# Patient Record
Sex: FEMALE | Race: WHITE | NOT HISPANIC OR LATINO | URBAN - METROPOLITAN AREA
[De-identification: names, ages, dates, MRNs, and addresses within clinical notes are randomized per-mention and may not be internally consistent; named-entity substitution may affect disease eponyms.]

---

## 2017-08-07 PROCEDURE — 87340 HEPATITIS B SURFACE AG IA: CPT | Performed by: FAMILY MEDICINE

## 2017-08-07 PROCEDURE — 87389 HIV-1 AG W/HIV-1&-2 AB AG IA: CPT | Performed by: FAMILY MEDICINE

## 2017-08-07 PROCEDURE — 86803 HEPATITIS C AB TEST: CPT | Performed by: FAMILY MEDICINE

## 2017-08-07 PROCEDURE — 88175 CYTOPATH C/V AUTO FLUID REDO: CPT | Performed by: FAMILY MEDICINE

## 2017-08-07 PROCEDURE — 36415 COLL VENOUS BLD VENIPUNCTURE: CPT | Performed by: FAMILY MEDICINE

## 2018-12-28 ENCOUNTER — WALK IN (OUTPATIENT)
Dept: URGENT CARE | Age: 28
End: 2018-12-28

## 2018-12-28 VITALS
WEIGHT: 202.38 LBS | RESPIRATION RATE: 16 BRPM | TEMPERATURE: 98 F | HEART RATE: 80 BPM | SYSTOLIC BLOOD PRESSURE: 126 MMHG | DIASTOLIC BLOOD PRESSURE: 76 MMHG

## 2018-12-28 DIAGNOSIS — J00 ACUTE NASOPHARYNGITIS: ICD-10-CM

## 2018-12-28 DIAGNOSIS — J01.90 ACUTE NON-RECURRENT SINUSITIS, UNSPECIFIED LOCATION: Primary | ICD-10-CM

## 2018-12-28 PROCEDURE — 99204 OFFICE O/P NEW MOD 45 MIN: CPT | Performed by: NURSE PRACTITIONER

## 2018-12-28 RX ORDER — NORETHINDRONE ACETATE AND ETHINYL ESTRADIOL 1; 5 MG/1; UG/1
TABLET ORAL
COMMUNITY

## 2018-12-28 RX ORDER — BENZONATATE 100 MG/1
CAPSULE ORAL
Qty: 14 CAPSULE | Refills: 0 | Status: SHIPPED | OUTPATIENT
Start: 2018-12-28

## 2018-12-28 RX ORDER — IBUPROFEN 200 MG
600 TABLET ORAL EVERY 8 HOURS PRN
Qty: 30 TABLET | Refills: 0 | COMMUNITY
Start: 2018-12-28

## 2018-12-28 RX ORDER — ACETAMINOPHEN 500 MG
1000 TABLET ORAL EVERY 8 HOURS PRN
Qty: 30 TABLET | Refills: 0 | COMMUNITY
Start: 2018-12-28

## 2018-12-28 RX ORDER — AMOXICILLIN AND CLAVULANATE POTASSIUM 875; 125 MG/1; MG/1
1 TABLET, FILM COATED ORAL 2 TIMES DAILY
Qty: 10 TABLET | Refills: 0 | Status: SHIPPED | OUTPATIENT
Start: 2018-12-28 | End: 2019-01-02

## 2018-12-28 RX ORDER — PREDNISONE 20 MG/1
60 TABLET ORAL DAILY
Qty: 9 TABLET | Refills: 0 | Status: SHIPPED | OUTPATIENT
Start: 2018-12-28 | End: 2018-12-31

## 2018-12-28 ASSESSMENT — ENCOUNTER SYMPTOMS
DIARRHEA: 0
NAUSEA: 0
VOMITING: 0
SHORTNESS OF BREATH: 0

## 2019-05-01 PROBLEM — F51.01 PRIMARY INSOMNIA: Status: ACTIVE | Noted: 2019-05-01

## 2019-05-01 PROBLEM — R61 DIAPHORESIS: Status: ACTIVE | Noted: 2019-05-01

## 2019-05-01 PROBLEM — R00.2 HEART PALPITATIONS: Status: ACTIVE | Noted: 2019-05-01

## 2019-09-03 PROBLEM — Z92.89 HOSPITALIZATION WITHIN LAST 30 DAYS: Status: ACTIVE | Noted: 2019-09-03

## 2019-09-03 PROBLEM — I26.99 OTHER ACUTE PULMONARY EMBOLISM WITHOUT ACUTE COR PULMONALE (HCC): Status: ACTIVE | Noted: 2019-09-03

## 2019-09-03 PROBLEM — I82.492 ACUTE DEEP VEIN THROMBOSIS (DVT) OF OTHER SPECIFIED VEIN OF LEFT LOWER EXTREMITY (HCC): Status: ACTIVE | Noted: 2019-09-03

## 2019-09-03 PROBLEM — Z30.42 ENCOUNTER FOR SURVEILLANCE OF INJECTABLE CONTRACEPTIVE: Status: ACTIVE | Noted: 2019-09-03

## 2019-09-03 PROBLEM — R39.9 SYMPTOMS INVOLVING URINARY SYSTEM: Status: ACTIVE | Noted: 2019-09-03

## 2019-09-03 PROCEDURE — 87086 URINE CULTURE/COLONY COUNT: CPT | Performed by: FAMILY MEDICINE

## 2019-09-03 PROCEDURE — 81003 URINALYSIS AUTO W/O SCOPE: CPT | Performed by: FAMILY MEDICINE

## 2019-09-19 ENCOUNTER — LAB ENCOUNTER (OUTPATIENT)
Dept: LAB | Facility: HOSPITAL | Age: 29
End: 2019-09-19
Attending: INTERNAL MEDICINE
Payer: COMMERCIAL

## 2019-09-19 DIAGNOSIS — D68.9 BLOOD CLOTTING DISORDER (HCC): Primary | ICD-10-CM

## 2019-09-19 PROCEDURE — 36415 COLL VENOUS BLD VENIPUNCTURE: CPT

## 2019-09-19 PROCEDURE — 81241 F5 GENE: CPT

## 2019-09-19 PROCEDURE — 81240 F2 GENE: CPT

## 2019-09-19 PROCEDURE — 85306 CLOT INHIBIT PROT S FREE: CPT

## 2019-09-19 PROCEDURE — 85303 CLOT INHIBIT PROT C ACTIVITY: CPT

## 2019-09-19 PROCEDURE — 85301 ANTITHROMBIN III ANTIGEN: CPT

## 2019-09-20 LAB — F2 C.20210G>A GENO BLD/T: NORMAL

## 2019-09-21 LAB
ANTITHROMBIN, ANTIGEN: 84 %
PROTEIN C FUNCTIONAL: 126 %
PROTEIN S FUNCTIONAL: 73 %

## 2019-09-23 ENCOUNTER — OFFICE VISIT (OUTPATIENT)
Dept: HEMATOLOGY/ONCOLOGY | Age: 29
End: 2019-09-23
Attending: INTERNAL MEDICINE
Payer: COMMERCIAL

## 2019-09-23 VITALS
TEMPERATURE: 98 F | HEART RATE: 81 BPM | BODY MASS INDEX: 27 KG/M2 | SYSTOLIC BLOOD PRESSURE: 127 MMHG | OXYGEN SATURATION: 99 % | RESPIRATION RATE: 18 BRPM | WEIGHT: 187.19 LBS | DIASTOLIC BLOOD PRESSURE: 81 MMHG

## 2019-09-23 DIAGNOSIS — I82.412 ACUTE DEEP VEIN THROMBOSIS (DVT) OF FEMORAL VEIN OF LEFT LOWER EXTREMITY (HCC): Primary | ICD-10-CM

## 2019-09-23 LAB
ALBUMIN SERPL-MCNC: 3.9 G/DL (ref 3.4–5)
ALBUMIN/GLOB SERPL: 1 {RATIO} (ref 1–2)
ALP LIVER SERPL-CCNC: 52 U/L (ref 37–98)
ALT SERPL-CCNC: 47 U/L (ref 13–56)
ANION GAP SERPL CALC-SCNC: 6 MMOL/L (ref 0–18)
AST SERPL-CCNC: 27 U/L (ref 15–37)
BASOPHILS # BLD AUTO: 0.05 X10(3) UL (ref 0–0.2)
BASOPHILS NFR BLD AUTO: 0.7 %
BILIRUB SERPL-MCNC: 0.5 MG/DL (ref 0.1–2)
BUN BLD-MCNC: 14 MG/DL (ref 7–18)
BUN/CREAT SERPL: 14.3 (ref 10–20)
CALCIUM BLD-MCNC: 8.8 MG/DL (ref 8.5–10.1)
CHLORIDE SERPL-SCNC: 104 MMOL/L (ref 98–112)
CO2 SERPL-SCNC: 28 MMOL/L (ref 21–32)
CREAT BLD-MCNC: 0.98 MG/DL (ref 0.55–1.02)
D-DIMER: <0.27 UG/ML FEU (ref ?–0.5)
DEPRECATED HBV CORE AB SER IA-ACNC: 57.5 NG/ML (ref 12–114)
DEPRECATED RDW RBC AUTO: 39.7 FL (ref 35.1–46.3)
EOSINOPHIL # BLD AUTO: 0.09 X10(3) UL (ref 0–0.7)
EOSINOPHIL NFR BLD AUTO: 1.3 %
ERYTHROCYTE [DISTWIDTH] IN BLOOD BY AUTOMATED COUNT: 12.3 % (ref 11–15)
GLOBULIN PLAS-MCNC: 3.8 G/DL (ref 2.8–4.4)
GLUCOSE BLD-MCNC: 85 MG/DL (ref 70–99)
HCT VFR BLD AUTO: 42.6 % (ref 35–48)
HGB BLD-MCNC: 14 G/DL (ref 12–16)
IMM GRANULOCYTES # BLD AUTO: 0.02 X10(3) UL (ref 0–1)
IMM GRANULOCYTES NFR BLD: 0.3 %
IRON SATURATION: 38 % (ref 15–50)
IRON SERPL-MCNC: 149 UG/DL (ref 50–170)
LYMPHOCYTES # BLD AUTO: 2.09 X10(3) UL (ref 1–4)
LYMPHOCYTES NFR BLD AUTO: 29.6 %
M PROTEIN MFR SERPL ELPH: 7.7 G/DL (ref 6.4–8.2)
MCH RBC QN AUTO: 29 PG (ref 26–34)
MCHC RBC AUTO-ENTMCNC: 32.9 G/DL (ref 31–37)
MCV RBC AUTO: 88.2 FL (ref 80–100)
MONOCYTES # BLD AUTO: 0.4 X10(3) UL (ref 0.1–1)
MONOCYTES NFR BLD AUTO: 5.7 %
NEUTROPHILS # BLD AUTO: 4.41 X10 (3) UL (ref 1.5–7.7)
NEUTROPHILS # BLD AUTO: 4.41 X10(3) UL (ref 1.5–7.7)
NEUTROPHILS NFR BLD AUTO: 62.4 %
OSMOLALITY SERPL CALC.SUM OF ELEC: 286 MOSM/KG (ref 275–295)
PLATELET # BLD AUTO: 169 10(3)UL (ref 150–450)
POTASSIUM SERPL-SCNC: 3.8 MMOL/L (ref 3.5–5.1)
RBC # BLD AUTO: 4.83 X10(6)UL (ref 3.8–5.3)
SODIUM SERPL-SCNC: 138 MMOL/L (ref 136–145)
TOTAL IRON BINDING CAPACITY: 390 UG/DL (ref 240–450)
TRANSFERRIN SERPL-MCNC: 262 MG/DL (ref 200–360)
WBC # BLD AUTO: 7.1 X10(3) UL (ref 4–11)

## 2019-09-23 PROCEDURE — 99244 OFF/OP CNSLTJ NEW/EST MOD 40: CPT | Performed by: INTERNAL MEDICINE

## 2019-09-23 NOTE — PROGRESS NOTES
[facility] Note    Diagnosis:   1. LLE DVT (SFV, Popliteal, peroneal and gastrocnemius). (08/13/19) 2/2 OCP, Factor V Leiden  2. Left lower lobe subsegmental artery PE without strain (08/13/19) 2/2 OCP, Factor V Leiden  3.  Hetero tablet Rfl: 3   Econazole Nitrate 1 % External Cream Apply 1 g topically 2 (two) times daily. Disp: 1 Tube Rfl: 3   medroxyPROGESTERone Acetate 150 MG/ML Intramuscular Suspension Inject 1 mL (150 mg total) into the muscle every 3 (three) months.  Disp: 1 mL 09/03/2019     09/03/2019    BUN 16.0 09/03/2019    GLUCOSE 84 05/20/2016    ALB 3.8 05/01/2019       Radiology: OSH imaging reports reviewed in care everywhere     Pathology: reviewed     Assessment and Plan:  LLE DVT and Left Subsegmental PE + Hete

## 2019-09-23 NOTE — PROGRESS NOTES
Education Record    Learner:  Patient and Family Member    Disease / Diagnosis:DVT with PE     Barriers / Limitations:  None   Comments:    Method:  Discussion   Comments:    General Topics:  Plan of care reviewed   Comments:    Outcome:  Shows understandi

## 2019-09-24 ENCOUNTER — LAB ENCOUNTER (OUTPATIENT)
Dept: LAB | Age: 29
End: 2019-09-24
Attending: INTERNAL MEDICINE
Payer: COMMERCIAL

## 2019-09-24 DIAGNOSIS — L60.9 NAIL DISORDER: Primary | ICD-10-CM

## 2019-09-24 PROCEDURE — 87101 SKIN FUNGI CULTURE: CPT

## 2019-09-24 PROCEDURE — 87107 FUNGI IDENTIFICATION MOLD: CPT

## 2019-09-25 ENCOUNTER — TELEPHONE (OUTPATIENT)
Dept: HEMATOLOGY/ONCOLOGY | Facility: HOSPITAL | Age: 29
End: 2019-09-25

## 2019-09-25 ENCOUNTER — HOSPITAL ENCOUNTER (OUTPATIENT)
Dept: INTERVENTIONAL RADIOLOGY/VASCULAR | Facility: HOSPITAL | Age: 29
Discharge: HOME | End: 2019-09-25
Attending: INTERNAL MEDICINE | Admitting: INTERNAL MEDICINE
Payer: COMMERCIAL

## 2019-09-25 VITALS
HEART RATE: 77 BPM | HEIGHT: 70 IN | BODY MASS INDEX: 26.77 KG/M2 | DIASTOLIC BLOOD PRESSURE: 69 MMHG | OXYGEN SATURATION: 96 % | RESPIRATION RATE: 16 BRPM | WEIGHT: 187 LBS | TEMPERATURE: 98 F | SYSTOLIC BLOOD PRESSURE: 111 MMHG

## 2019-09-25 DIAGNOSIS — I82.412 ACUTE DEEP VEIN THROMBOSIS (DVT) OF FEMORAL VEIN OF LEFT LOWER EXTREMITY (HCC): ICD-10-CM

## 2019-09-25 DIAGNOSIS — I82.412 ACUTE DEEP VEIN THROMBOSIS (DVT) OF FEMORAL VEIN OF LEFT LOWER EXTREMITY (HCC): Primary | ICD-10-CM

## 2019-09-25 LAB
INR BLD: 1.16 (ref 0.9–1.1)
PSA SERPL DL<=0.01 NG/ML-MCNC: 15.3 SECONDS (ref 12.5–14.7)

## 2019-09-25 PROCEDURE — 99153 MOD SED SAME PHYS/QHP EA: CPT

## 2019-09-25 PROCEDURE — 99152 MOD SED SAME PHYS/QHP 5/>YRS: CPT

## 2019-09-25 PROCEDURE — 36011 PLACE CATHETER IN VEIN: CPT

## 2019-09-25 PROCEDURE — B41G1ZZ FLUOROSCOPY OF LEFT LOWER EXTREMITY ARTERIES USING LOW OSMOLAR CONTRAST: ICD-10-PCS | Performed by: RADIOLOGY

## 2019-09-25 PROCEDURE — 36415 COLL VENOUS BLD VENIPUNCTURE: CPT

## 2019-09-25 PROCEDURE — B5191ZZ FLUOROSCOPY OF INFERIOR VENA CAVA USING LOW OSMOLAR CONTRAST: ICD-10-PCS | Performed by: RADIOLOGY

## 2019-09-25 PROCEDURE — 36012 PLACE CATHETER IN VEIN: CPT

## 2019-09-25 PROCEDURE — 85610 PROTHROMBIN TIME: CPT | Performed by: INTERNAL MEDICINE

## 2019-09-25 PROCEDURE — 75825 VEIN X-RAY TRUNK: CPT

## 2019-09-25 PROCEDURE — 75822 VEIN X-RAY ARMS/LEGS: CPT

## 2019-09-25 PROCEDURE — 76937 US GUIDE VASCULAR ACCESS: CPT

## 2019-09-25 RX ORDER — LIDOCAINE HYDROCHLORIDE 10 MG/ML
INJECTION, SOLUTION INFILTRATION; PERINEURAL
Status: COMPLETED
Start: 2019-09-25 | End: 2019-09-25

## 2019-09-25 RX ORDER — HEPARIN SODIUM 5000 [USP'U]/ML
INJECTION, SOLUTION INTRAVENOUS; SUBCUTANEOUS
Status: COMPLETED
Start: 2019-09-25 | End: 2019-09-25

## 2019-09-25 RX ORDER — SODIUM CHLORIDE 9 MG/ML
INJECTION, SOLUTION INTRAVENOUS CONTINUOUS
Status: DISCONTINUED | OUTPATIENT
Start: 2019-09-25 | End: 2019-09-25

## 2019-09-25 RX ORDER — MIDAZOLAM HYDROCHLORIDE 1 MG/ML
INJECTION INTRAMUSCULAR; INTRAVENOUS
Status: COMPLETED
Start: 2019-09-25 | End: 2019-09-25

## 2019-09-25 RX ADMIN — SODIUM CHLORIDE: 9 INJECTION, SOLUTION INTRAVENOUS at 07:30:00

## 2019-09-25 NOTE — TELEPHONE ENCOUNTER
Pt stts she was referred to IR- pt doesn't remember the physicians name but he wanted pt to call and have  order a ct scan of the abdomen. pls call pt when ordered.  Thank you

## 2019-09-25 NOTE — PROGRESS NOTES
Rc'd pt from IR in stable condition. VSS. Manual dressings to bilateral groins are soft, clean and dry. No bleeding or hematoma. Pt denies c/o pain or discomfort. DR Caitlin Still at bedside along with mother. Pt to remain on bedrest for 2hrs.      112:00: Bedrest co

## 2019-09-25 NOTE — PROCEDURES
[facility]  Procedure Note    [place] Patient Status:  Outpatient in a Bed    1990 MRN [id number]   Location [facility] [address] [facility] Attending Latisha White MD   Hosp Day # 0 PCP None Pcp     Procedure: LLE venogram,

## 2019-09-25 NOTE — H&P
[facility] Patient Status:  Outpatient in a Bed    1990 MRN [id number]   Location [address] [facility] Attending Prasad Ramos MD   Hosp Day # 0 PCP None Pcp     Admitting Diagnosis: Assessment/Plan:   Impression: 33 yo F DVT    I have discussed with the patient and/or legal representative the potential benefits, risks, and side effects of this procedure, the likelihood of the patient achieving goals; and the potential problems t

## 2019-09-27 LAB
ANA SCREEN: POSITIVE
CENTROMERE AUTOAB: <100 AU/ML (ref ?–100)
DSDNA AUTOAB: <100 IU/ML (ref ?–100)
HISTONE AUTOAB: <100 AU/ML (ref ?–100)
JO-1 AUTOAB: <100 AU/ML (ref ?–100)
RNP AUTOAB: <100 AU/ML (ref ?–100)
SCL-70 AUTOAB: 148 AU/ML (ref ?–100)
SM AUTOAB (SMITH): <100 AU/ML (ref ?–100)
SSA AUTOAB: <100 AU/ML (ref ?–100)
SSB AUTOAB: <100 AU/ML (ref ?–100)

## 2019-09-28 ENCOUNTER — HOSPITAL ENCOUNTER (OUTPATIENT)
Dept: CT IMAGING | Age: 29
Discharge: HOME | End: 2019-09-28
Attending: INTERNAL MEDICINE
Payer: COMMERCIAL

## 2019-09-28 DIAGNOSIS — I82.412 ACUTE DEEP VEIN THROMBOSIS (DVT) OF FEMORAL VEIN OF LEFT LOWER EXTREMITY (HCC): ICD-10-CM

## 2019-09-28 PROCEDURE — 74177 CT ABD & PELVIS W/CONTRAST: CPT | Performed by: INTERNAL MEDICINE

## 2019-11-18 ENCOUNTER — APPOINTMENT (OUTPATIENT)
Dept: HEMATOLOGY/ONCOLOGY | Age: 29
End: 2019-11-18
Attending: INTERNAL MEDICINE
Payer: COMMERCIAL

## 2019-11-25 ENCOUNTER — OFFICE VISIT (OUTPATIENT)
Dept: HEMATOLOGY/ONCOLOGY | Age: 29
End: 2019-11-25
Attending: INTERNAL MEDICINE
Payer: COMMERCIAL

## 2019-11-25 VITALS
RESPIRATION RATE: 18 BRPM | OXYGEN SATURATION: 99 % | WEIGHT: 195.19 LBS | SYSTOLIC BLOOD PRESSURE: 106 MMHG | DIASTOLIC BLOOD PRESSURE: 66 MMHG | BODY MASS INDEX: 28 KG/M2 | HEART RATE: 71 BPM | TEMPERATURE: 98 F

## 2019-11-25 DIAGNOSIS — I82.492 ACUTE DEEP VEIN THROMBOSIS (DVT) OF OTHER SPECIFIED VEIN OF LEFT LOWER EXTREMITY (HCC): Primary | ICD-10-CM

## 2019-11-25 PROCEDURE — 99213 OFFICE O/P EST LOW 20 MIN: CPT | Performed by: INTERNAL MEDICINE

## 2019-11-25 NOTE — PROGRESS NOTES
Education Record    Learner:  Patient    Disease / Diagnosis:DVT LLE     Barriers / Limitations:  None   Comments:    Method:  Discussion   Comments:    General Topics:  Plan of care reviewed   Comments:    Outcome:  Shows understanding   Comments:    Paulette

## 2019-11-25 NOTE — PROGRESS NOTES
[facility]    Diagnosis:   1. LLE DVT (SFV, Popliteal, peroneal and gastrocnemius). (08/13/19) 2/2 OCP, Factor V Leiden  2. Left lower lobe subsegmental artery PE without strain (08/13/19) 2/2 OCP, Factor V Leiden  3.  Hetero has substernal pain with exercise. She does have Raynaud's, more in the winter. 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 She is having extremely heavy periods every 2-3 weeks.  Her periods last 7 days and she sometimes ne murmurs  Extremities: No lower extremity swelling   Lungs: CTAB, no increased work of breathing  Abd: soft nt nd +BS no hepatosplenomegaly  Neuro: CN: II-XII grossly intact    Results:  Lab Results   Component Value Date    WBC 7.1 09/23/2019    HGB 14.0 0 progestin-only method. However, Depo-Provera is an acceptable contraceptive method if desired.      [facility]

## 2019-11-26 ENCOUNTER — APPOINTMENT (OUTPATIENT)
Dept: HEMATOLOGY/ONCOLOGY | Age: 29
End: 2019-11-26
Attending: INTERNAL MEDICINE
Payer: COMMERCIAL

## 2019-11-27 ENCOUNTER — TELEPHONE (OUTPATIENT)
Dept: HEMATOLOGY/ONCOLOGY | Facility: HOSPITAL | Age: 29
End: 2019-11-27

## 2019-11-27 NOTE — TELEPHONE ENCOUNTER
Spoke with patient. Prasad Ramos MD  P Edw B[facility] Michael Rns             Results reviewed. D-dimer is negative and she is negative for the Lupus Anticoagulant. Repeat D-dimer in 1 month.  Thanks

## 2019-12-17 ENCOUNTER — TELEPHONE (OUTPATIENT)
Dept: HEMATOLOGY/ONCOLOGY | Facility: HOSPITAL | Age: 29
End: 2019-12-17

## 2019-12-17 NOTE — TELEPHONE ENCOUNTER
Fernandez Del Toro called to ask that Dr. Nurys Chadnler place a referral in for her to see Dr. Yina Vaca. The office wont see her until until the referral is placed.

## 2019-12-23 ENCOUNTER — APPOINTMENT (OUTPATIENT)
Dept: HEMATOLOGY/ONCOLOGY | Age: 29
End: 2019-12-23
Attending: INTERNAL MEDICINE
Payer: COMMERCIAL

## 2019-12-23 ENCOUNTER — TELEPHONE (OUTPATIENT)
Dept: HEMATOLOGY/ONCOLOGY | Facility: HOSPITAL | Age: 29
End: 2019-12-23

## 2019-12-23 DIAGNOSIS — I82.492 ACUTE DEEP VEIN THROMBOSIS (DVT) OF OTHER SPECIFIED VEIN OF LEFT LOWER EXTREMITY (HCC): ICD-10-CM

## 2019-12-23 PROCEDURE — 36415 COLL VENOUS BLD VENIPUNCTURE: CPT

## 2019-12-23 PROCEDURE — 85379 FIBRIN DEGRADATION QUANT: CPT

## 2019-12-23 NOTE — TELEPHONE ENCOUNTER
Spoke with patient. She verbalized understanding. Refill sent. Sandy Rodríguez MD  P Edw Petra Rodriguez Rns             Results reviewed. Her D-dimer is elevated while off anticoagulation. Can you ask her to restart Eliquis for 3 more months.  After that we

## 2019-12-26 ENCOUNTER — HOSPITAL ENCOUNTER (OUTPATIENT)
Dept: ULTRASOUND IMAGING | Age: 29
Discharge: HOME | End: 2019-12-26
Attending: INTERNAL MEDICINE
Payer: COMMERCIAL

## 2019-12-26 ENCOUNTER — TELEPHONE (OUTPATIENT)
Dept: HEMATOLOGY/ONCOLOGY | Age: 29
End: 2019-12-26

## 2019-12-26 DIAGNOSIS — I82.492 ACUTE DEEP VEIN THROMBOSIS (DVT) OF OTHER SPECIFIED VEIN OF LEFT LOWER EXTREMITY (HCC): ICD-10-CM

## 2019-12-26 DIAGNOSIS — I82.412 ACUTE DEEP VEIN THROMBOSIS (DVT) OF FEMORAL VEIN OF LEFT LOWER EXTREMITY (HCC): ICD-10-CM

## 2019-12-26 NOTE — TELEPHONE ENCOUNTER
Spoke with patient. Bilateral US ordered per MD permission. Patient updated with time and location of exam. She verbalized understanding.

## 2019-12-27 ENCOUNTER — HOSPITAL ENCOUNTER (OUTPATIENT)
Dept: ULTRASOUND IMAGING | Age: 29
Discharge: HOME | End: 2019-12-27
Attending: INTERNAL MEDICINE
Payer: COMMERCIAL

## 2019-12-27 ENCOUNTER — TELEPHONE (OUTPATIENT)
Dept: HEMATOLOGY/ONCOLOGY | Facility: HOSPITAL | Age: 29
End: 2019-12-27

## 2019-12-27 DIAGNOSIS — I82.4Z2 ACUTE DEEP VEIN THROMBOSIS (DVT) OF DISTAL VEIN OF LEFT LOWER EXTREMITY (HCC): Primary | ICD-10-CM

## 2019-12-27 PROCEDURE — 93970 EXTREMITY STUDY: CPT | Performed by: INTERNAL MEDICINE

## 2019-12-27 NOTE — TELEPHONE ENCOUNTER
Discussed US Results with Dr. Idania Noonan, she appears to have an acute LLE DVT associated with an elevated D-dimer. She is symptomatic in left calf. She has been off of Eliquis for >1 month.  We will restart her Eliquis with 10 mg BID for 1 week followed by 5 mg B

## 2020-01-02 ENCOUNTER — TELEPHONE (OUTPATIENT)
Dept: HEMATOLOGY/ONCOLOGY | Age: 30
End: 2020-01-02

## 2020-01-02 ENCOUNTER — TELEPHONE (OUTPATIENT)
Dept: HEMATOLOGY/ONCOLOGY | Facility: HOSPITAL | Age: 30
End: 2020-01-02

## 2020-01-02 ENCOUNTER — TELEPHONE (OUTPATIENT)
Dept: FAMILY MEDICINE CLINIC | Facility: CLINIC | Age: 30
End: 2020-01-02

## 2020-01-02 DIAGNOSIS — R76.8 POSITIVE ANA (ANTINUCLEAR ANTIBODY): Primary | ICD-10-CM

## 2020-01-02 NOTE — TELEPHONE ENCOUNTER
Idalia Jhaveri from Dr. Coty Duque office called requesting that a referral be placed for the pt to be seen by teodoro Hermosillo, for elevated FERNY. Per Idalia Jhaveri, Dr. Coty Duque office tried to place the referral and were informed that the referral must come from the PCP.

## 2020-01-10 ENCOUNTER — LAB ENCOUNTER (OUTPATIENT)
Dept: LAB | Age: 30
End: 2020-01-10
Attending: INTERNAL MEDICINE
Payer: COMMERCIAL

## 2020-01-10 ENCOUNTER — OFFICE VISIT (OUTPATIENT)
Dept: RHEUMATOLOGY | Facility: CLINIC | Age: 30
End: 2020-01-10

## 2020-01-10 ENCOUNTER — APPOINTMENT (OUTPATIENT)
Dept: HEMATOLOGY/ONCOLOGY | Age: 30
End: 2020-01-10
Attending: INTERNAL MEDICINE
Payer: COMMERCIAL

## 2020-01-10 VITALS
TEMPERATURE: 98 F | SYSTOLIC BLOOD PRESSURE: 116 MMHG | RESPIRATION RATE: 16 BRPM | HEART RATE: 68 BPM | WEIGHT: 192 LBS | BODY MASS INDEX: 28 KG/M2 | DIASTOLIC BLOOD PRESSURE: 76 MMHG

## 2020-01-10 DIAGNOSIS — R76.8 SCL-70 ANTIBODY POSITIVE: ICD-10-CM

## 2020-01-10 DIAGNOSIS — I82.5Y2 CHRONIC DEEP VEIN THROMBOSIS (DVT) OF PROXIMAL VEIN OF LEFT LOWER EXTREMITY (HCC): ICD-10-CM

## 2020-01-10 DIAGNOSIS — R76.8 POSITIVE ANA (ANTINUCLEAR ANTIBODY): ICD-10-CM

## 2020-01-10 DIAGNOSIS — R53.82 CHRONIC FATIGUE: ICD-10-CM

## 2020-01-10 DIAGNOSIS — I73.00 RAYNAUD'S DISEASE WITHOUT GANGRENE: ICD-10-CM

## 2020-01-10 DIAGNOSIS — R76.8 POSITIVE ANA (ANTINUCLEAR ANTIBODY): Primary | ICD-10-CM

## 2020-01-10 DIAGNOSIS — I82.4Z2 ACUTE DEEP VEIN THROMBOSIS (DVT) OF DISTAL VEIN OF LEFT LOWER EXTREMITY (HCC): ICD-10-CM

## 2020-01-10 DIAGNOSIS — R13.10 ODYNOPHAGIA: ICD-10-CM

## 2020-01-10 LAB
BILIRUB UR QL STRIP.AUTO: NEGATIVE
CLARITY UR REFRACT.AUTO: CLEAR
COLOR UR AUTO: YELLOW
D-DIMER: 0.62 UG/ML FEU (ref ?–0.5)
GLUCOSE UR STRIP.AUTO-MCNC: NEGATIVE MG/DL
KETONES UR STRIP.AUTO-MCNC: NEGATIVE MG/DL
LEUKOCYTE ESTERASE UR QL STRIP.AUTO: NEGATIVE
NITRITE UR QL STRIP.AUTO: NEGATIVE
PH UR STRIP.AUTO: 8 [PH] (ref 4.5–8)
PROT UR STRIP.AUTO-MCNC: NEGATIVE MG/DL
RBC UR QL AUTO: NEGATIVE
SP GR UR STRIP.AUTO: 1.01 (ref 1–1.03)
T4 FREE SERPL-MCNC: 0.8 NG/DL (ref 0.8–1.7)
TSI SER-ACNC: 0.99 MIU/ML (ref 0.36–3.74)
UROBILINOGEN UR STRIP.AUTO-MCNC: <2 MG/DL

## 2020-01-10 PROCEDURE — 86038 ANTINUCLEAR ANTIBODIES: CPT

## 2020-01-10 PROCEDURE — 86225 DNA ANTIBODY NATIVE: CPT

## 2020-01-10 PROCEDURE — 86235 NUCLEAR ANTIGEN ANTIBODY: CPT

## 2020-01-10 PROCEDURE — 83876 ASSAY MYELOPEROXIDASE: CPT

## 2020-01-10 PROCEDURE — 36415 COLL VENOUS BLD VENIPUNCTURE: CPT

## 2020-01-10 PROCEDURE — 85610 PROTHROMBIN TIME: CPT

## 2020-01-10 PROCEDURE — 83516 IMMUNOASSAY NONANTIBODY: CPT

## 2020-01-10 PROCEDURE — 99244 OFF/OP CNSLTJ NEW/EST MOD 40: CPT | Performed by: INTERNAL MEDICINE

## 2020-01-10 PROCEDURE — 81003 URINALYSIS AUTO W/O SCOPE: CPT

## 2020-01-10 PROCEDURE — 81291 MTHFR GENE: CPT

## 2020-01-10 PROCEDURE — 85732 THROMBOPLASTIN TIME PARTIAL: CPT

## 2020-01-10 PROCEDURE — 85379 FIBRIN DEGRADATION QUANT: CPT

## 2020-01-10 PROCEDURE — 84439 ASSAY OF FREE THYROXINE: CPT

## 2020-01-10 PROCEDURE — 85613 RUSSELL VIPER VENOM DILUTED: CPT

## 2020-01-10 PROCEDURE — 84443 ASSAY THYROID STIM HORMONE: CPT

## 2020-01-10 PROCEDURE — 85705 THROMBOPLASTIN INHIBITION: CPT

## 2020-01-10 PROCEDURE — 86255 FLUORESCENT ANTIBODY SCREEN: CPT

## 2020-01-11 LAB — GBM AB, IGG (MAFD): 0 AU/ML

## 2020-01-13 LAB
ANA SCREEN: POSITIVE
CENTROMERE AUTOAB: <100 AU/ML (ref ?–100)
DSDNA AUTOAB: <100 IU/ML (ref ?–100)
HISTONE AUTOAB: <100 AU/ML (ref ?–100)
JO-1 AUTOAB: <100 AU/ML (ref ?–100)
MTHFR MUTATION: C.1286A>C: NEGATIVE
MYELOPEROX ANTIBODIES, IGG: 0 AU/ML
RNP AUTOAB: <100 AU/ML (ref ?–100)
SCL-70 AUTOAB: 124 AU/ML (ref ?–100)
SERINE PROTEASE3, IGG: 3 AU/ML
SM AUTOAB (SMITH): <100 AU/ML (ref ?–100)
SSA AUTOAB: 275 AU/ML (ref ?–100)
SSB AUTOAB: <100 AU/ML (ref ?–100)

## 2020-01-14 LAB
APTT PPP: 27.9 SECONDS (ref 25.4–36.1)
DRVVT LUPUS ANTICOAGULANT: NEGATIVE
DRVVT SCREEN RATIO: 0.67 (ref 0–1.29)
PT(LUPUS): 14.5 SECONDS (ref 12.5–14.7)
STACLOT LA DELTA: 0.5 SECONDS (ref ?–8)
STACLOT LA: NEGATIVE

## 2020-01-20 ENCOUNTER — APPOINTMENT (OUTPATIENT)
Dept: HEMATOLOGY/ONCOLOGY | Age: 30
End: 2020-01-20
Attending: INTERNAL MEDICINE
Payer: COMMERCIAL

## 2020-01-29 ENCOUNTER — TELEPHONE (OUTPATIENT)
Dept: RHEUMATOLOGY | Facility: CLINIC | Age: 30
End: 2020-01-29

## 2020-01-29 DIAGNOSIS — R76.8 POSITIVE ANA (ANTINUCLEAR ANTIBODY): Primary | ICD-10-CM

## 2020-01-29 DIAGNOSIS — R76.0 ANTI-CARDIOLIPIN ANTIBODY POSITIVE: ICD-10-CM

## 2020-01-29 DIAGNOSIS — I82.5Y2 CHRONIC DEEP VEIN THROMBOSIS (DVT) OF PROXIMAL VEIN OF LEFT LOWER EXTREMITY (HCC): ICD-10-CM

## 2020-01-29 DIAGNOSIS — R76.8 SS-A ANTIBODY POSITIVE: ICD-10-CM

## 2020-01-29 DIAGNOSIS — R76.8 SCL-70 ANTIBODY POSITIVE: ICD-10-CM

## 2020-01-29 DIAGNOSIS — I73.00 RAYNAUD'S DISEASE WITHOUT GANGRENE: ICD-10-CM

## 2020-01-29 NOTE — TELEPHONE ENCOUNTER
Spoke to patient over the phone regarding her AVISE results which were grossly negative with the exception of an anticardiolipin antibody which was equivocal.  Previous antiphospholipid antibodies were negative.   Recommend that patient get repeat testing i

## 2020-02-09 NOTE — PROGRESS NOTES
[facility] Hematology and Oncology Clinic Note    Diagnosis and Treatment History:   1. LLE DVT (SFV, Popliteal, peroneal and gastrocnemius). (08/13/19) 2/2 OCP, Factor V Leiden  2.  Left lower lobe subsegmental artery PE without strain (08/13/19) 2/2 OCP, Fact that her leg swelling has resolved. No dyspnea. She occasionally feels like she has substernal pain with exercise. She does have Raynaud's, more in the winter. 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  She is having extre Alcohol use:  Yes        Alcohol/week: 0.0 standard drinks        Comment: social      Drug use: No     Family History   Problem Relation Age of Onset   • Hypertension Father    • Other (Other) Paternal Grandfather        Physical Exam  Height: --  Weight: consider eliquis 2.5 mg BID after she completes 6 months of therapeutic A/C (June 27, 2020)  - Lovenox once pregnant     Contraception: We discussed that Depo-Provera carries a <1% risk of VTE, however the FDA label states that women with a VTE should not

## 2020-02-10 ENCOUNTER — OFFICE VISIT (OUTPATIENT)
Dept: HEMATOLOGY/ONCOLOGY | Age: 30
End: 2020-02-10
Attending: INTERNAL MEDICINE
Payer: COMMERCIAL

## 2020-02-10 VITALS
DIASTOLIC BLOOD PRESSURE: 71 MMHG | OXYGEN SATURATION: 99 % | TEMPERATURE: 98 F | SYSTOLIC BLOOD PRESSURE: 109 MMHG | BODY MASS INDEX: 27 KG/M2 | RESPIRATION RATE: 18 BRPM | WEIGHT: 191.38 LBS | HEART RATE: 91 BPM

## 2020-02-10 DIAGNOSIS — I82.492 ACUTE DEEP VEIN THROMBOSIS (DVT) OF OTHER SPECIFIED VEIN OF LEFT LOWER EXTREMITY (HCC): ICD-10-CM

## 2020-02-10 PROBLEM — I26.99 PULMONARY EMBOLUS (HCC): Status: ACTIVE | Noted: 2019-08-13

## 2020-02-10 PROBLEM — I82.4Y2 ACUTE DEEP VEIN THROMBOSIS (DVT) OF PROXIMAL VEIN OF LEFT LOWER EXTREMITY (HCC): Status: ACTIVE | Noted: 2019-08-13

## 2020-02-10 LAB — D-DIMER: 0.51 UG/ML FEU (ref ?–0.5)

## 2020-02-10 PROCEDURE — 99213 OFFICE O/P EST LOW 20 MIN: CPT | Performed by: INTERNAL MEDICINE

## 2020-02-10 NOTE — PROGRESS NOTES
Education Record    Learner:  Patient    Disease / Diagnosis:LLE DVT     Barriers / Limitations:  None   Comments:    Method:  Discussion   Comments:    General Topics:  Plan of care reviewed   Comments:    Outcome:  Shows understanding   Comments:    Paulette

## 2020-02-12 LAB
ANTITHROMBIN, ENZYMATIC (ACTIVITY): 92 %
PROTEIN C FUNCTIONAL: 93 %
PROTEIN S FUNCTIONAL: 71 %

## 2020-02-17 ENCOUNTER — APPOINTMENT (OUTPATIENT)
Dept: HEMATOLOGY/ONCOLOGY | Age: 30
End: 2020-02-17
Attending: INTERNAL MEDICINE
Payer: COMMERCIAL

## 2020-03-20 ENCOUNTER — TELEPHONE (OUTPATIENT)
Dept: HEMATOLOGY/ONCOLOGY | Facility: HOSPITAL | Age: 30
End: 2020-03-20

## 2020-03-20 NOTE — TELEPHONE ENCOUNTER
Ry Morales called to speak with [facility]. She says she needs extra elequis because her extra is in [facility], and she can't get it because she is quarantines to her home. She was wondering if she could come  some samples.  Please call

## 2020-04-27 ENCOUNTER — TELEPHONE (OUTPATIENT)
Dept: HEMATOLOGY/ONCOLOGY | Facility: HOSPITAL | Age: 30
End: 2020-04-27

## 2020-04-27 NOTE — TELEPHONE ENCOUNTER
Spoke with patient about her upcoming appt with Dr Emely Crowe. Reviewed the precautions we are taking due to the corona virus. Pt agreeable to telephone visit.

## 2020-05-01 ENCOUNTER — TELEMEDICINE (OUTPATIENT)
Dept: RHEUMATOLOGY | Facility: CLINIC | Age: 30
End: 2020-05-01

## 2020-05-01 VITALS — HEIGHT: 71 IN | BODY MASS INDEX: 26.6 KG/M2 | WEIGHT: 190 LBS

## 2020-05-01 DIAGNOSIS — R76.8 POSITIVE ANA (ANTINUCLEAR ANTIBODY): ICD-10-CM

## 2020-05-01 DIAGNOSIS — R76.8 SCL-70 ANTIBODY POSITIVE: ICD-10-CM

## 2020-05-01 DIAGNOSIS — I82.5Y2 CHRONIC DEEP VEIN THROMBOSIS (DVT) OF PROXIMAL VEIN OF LEFT LOWER EXTREMITY (HCC): ICD-10-CM

## 2020-05-01 DIAGNOSIS — I73.00 RAYNAUD'S DISEASE WITHOUT GANGRENE: ICD-10-CM

## 2020-05-01 DIAGNOSIS — R76.0 ANTI-CARDIOLIPIN ANTIBODY POSITIVE: Primary | ICD-10-CM

## 2020-05-01 PROCEDURE — 99213 OFFICE O/P EST LOW 20 MIN: CPT | Performed by: INTERNAL MEDICINE

## 2020-05-01 NOTE — PROGRESS NOTES
[facility] TeleHealth Audio and Visual Visit   Office visit canceled due to coronavirus pandemic    This was an audio/video conversation using [email or web address] in lieu of an in-person visit due to need to limit person to person contact during the [facility] with a recent history of left leg DVT/PE. She was placed on Eliquis and had her birth control stopped. Due to heavy periods, Eliquis was held and then patient developed what was thought to be an acute DVT in the same leg.   Her hypercoagulable work- gangrene  -     FERNY BY IFA, IGG; Future  -     ANTISCLERODERMA-70 ANTIBODIES; Future  -     LUPUS ANTICOAGULANT/ANTIPHOSPHOLIPID PANEL;  Future  -     SJOGREN'S ANTI-SS-A; Future  -     SJOGREN'S ANTI-SS-B; Future        Return in about 1 year (around 5/1/2 however when she went back to working out, she had worsened pain over the popliteal aspect and medial aspect of the knee. After going on a 3 mile hike, had significantly worsened pain and localized.  She noticed color changes of the leg and significantly sw pericarditis or pleuritis. Denies tightening of the skin, nonhealing ulcers on the fingertips, or severe acid reflux. The patient denies any history of uveitis.   There are no symptoms of severe dry eyes, dry mouth, or swelling of the cheeks or under the Cardiovascular: Positive for palpitations and leg swelling. Negative for chest pain and claudication. Gastrointestinal: Negative for abdominal pain, constipation, diarrhea, heartburn, nausea and vomiting.    Genitourinary: Negative for dysuria, frequenc Nursing note and vitals reviewed.     ?  Radiology review:    nothing pertinent to review     Labs:  Lab Results   Component Value Date    WBC 10.29 02/07/2020    RBC 4.66 02/07/2020    HGB 13.6 02/07/2020    HCT 41.9 02/07/2020     02/07/2020    M

## 2020-05-04 ENCOUNTER — VIRTUAL PHONE E/M (OUTPATIENT)
Dept: HEMATOLOGY/ONCOLOGY | Facility: HOSPITAL | Age: 30
End: 2020-05-04
Attending: INTERNAL MEDICINE
Payer: COMMERCIAL

## 2020-05-04 NOTE — PROGRESS NOTES
Virtual Telephone Check-In    Victor M verbally {consents to/declines (Can be done by front staff):6805} a Virtual/Telephone Check-In visit on 05/04/20.     Patient understands and accepts financial responsibility for any deductible, co-insurance a VTE. No smoking, alcohol or drugs. 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 Interval History:   11/25/19: CC discuss treatment.  D dimer from 9/23/19 wa (two) times daily. , Disp: 1 Tube, Rfl: 3    No current facility-administered medications on file prior to visit.      Past Medical History:   Diagnosis Date   • [facility]    • Factor V Leiden ([facility]    • Pulmonary embolism ([facility]      Past Lane (Dx 8/13/19, thought to be due to OCP and Heterozygous Factor V Leiden Mutation): completed 3 months of a/c with a negative D-dimer    2. Acute DVT on 12/27/19: Left calf pain + elevated D-dimer: restarted on Eliquis    3.  Equivocal IgG anticardiolipin on

## 2020-05-05 ENCOUNTER — TELEPHONE (OUTPATIENT)
Dept: HEMATOLOGY/ONCOLOGY | Facility: HOSPITAL | Age: 30
End: 2020-05-05

## 2020-05-05 ENCOUNTER — VIRTUAL PHONE E/M (OUTPATIENT)
Dept: HEMATOLOGY/ONCOLOGY | Facility: HOSPITAL | Age: 30
End: 2020-05-05
Attending: INTERNAL MEDICINE
Payer: COMMERCIAL

## 2020-05-05 DIAGNOSIS — N92.4 EXCESSIVE BLEEDING IN PREMENOPAUSAL PERIOD: Primary | ICD-10-CM

## 2020-05-05 PROCEDURE — 99213 OFFICE O/P EST LOW 20 MIN: CPT | Performed by: INTERNAL MEDICINE

## 2020-05-05 NOTE — PROGRESS NOTES
Virtual Telephone Check-In    Deatrice Blend verbally consents to a Virtual/Telephone Check-In visit on 05/5/20.  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 Interval History:   11/25/19: CC discuss treatment. D dimer from 9/23/19 was negative.  Anti-Scl 70 Ab mildly posit (5 mg total) by mouth 2 (two) times daily. , Disp: 60 tablet, Rfl: 3  SOOLANTRA 1 % External Cream, daily. , Disp: , Rfl: 2  Econazole Nitrate 1 % External Cream, Apply 1 g topically 2 (two) times daily. , Disp: 1 Tube, Rfl: 3    No current facility-administe D-dimer    2. Acute DVT on 12/27/19: Left calf pain + elevated D-dimer: restarted on Eliquis    3. Equivocal IgG anticardiolipin on AVISE (1/20/20). Previous testing at [facility] was negative.  Please note that her test showed \"positive\" for IgG antiCardioli

## 2020-05-05 NOTE — TELEPHONE ENCOUNTER
Dr Devang Sawyer recommendations are \"1. Gynecology referral placed ([place])   2. She needs samples of Eliquis 5 mg   3. Telephone visit around June 27     Spoke with patient, she is going to call her insurance to find out which doctors are in her network.

## 2020-06-29 ENCOUNTER — TELEPHONE (OUTPATIENT)
Dept: HEMATOLOGY/ONCOLOGY | Facility: HOSPITAL | Age: 30
End: 2020-06-29

## 2020-06-29 NOTE — TELEPHONE ENCOUNTER
Eva Pérez called saying she called to schedule her STAT US but the orders were not in. She asks that the order be sent to [facility] at fax number [phone].

## 2020-06-29 NOTE — TELEPHONE ENCOUNTER
Melissa calling back at [phone] to make sure orders where faxed to [phone].  Thank you Pastor Hutchinson

## 2020-06-29 NOTE — TELEPHONE ENCOUNTER
Kendell Ramirez called with discomfort and swelling for a couple of days to left lower leg, swelling from foot to chin, no redness, Hx of DVT to LLE, Heterozygous Factor V Leiden, Apixaban.

## 2020-06-30 ENCOUNTER — VIRTUAL PHONE E/M (OUTPATIENT)
Dept: HEMATOLOGY/ONCOLOGY | Facility: HOSPITAL | Age: 30
End: 2020-06-30
Attending: INTERNAL MEDICINE
Payer: COMMERCIAL

## 2020-06-30 DIAGNOSIS — I82.492 ACUTE DEEP VEIN THROMBOSIS (DVT) OF OTHER SPECIFIED VEIN OF LEFT LOWER EXTREMITY (HCC): Primary | ICD-10-CM

## 2020-06-30 PROCEDURE — 99213 OFFICE O/P EST LOW 20 MIN: CPT | Performed by: INTERNAL MEDICINE

## 2020-06-30 NOTE — PROGRESS NOTES
Virtual Telephone Check-In    Vernell Pierce verbally consents to a Virtual/Telephone Check-In visit on 6/30/20.  (done by staff)    Patient understands and accepts financial responsibility for any deductible, co-insurance and/or co-pays associated with alcohol or drugs. 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      Interval History:   6/30/20: CC follow up for a history of LLE DVT, LLL PE and heterozygous 1 Tube, Rfl: 3    No current facility-administered medications on file prior to visit.      Past Medical History:   Diagnosis Date   • Deep vein thrombosis ([facility]    • Factor V Leiden ([facility]    • Pulmonary embolism ([facility]      Past Surgical History:   Procedur \"positive\" for IgG antiCardiolipin , however her titer was only reported at >20 units which is not diagnostic for APLS, as titers > 40 U or 99th percentile are considered positive. She has had 2 negative tests in the past. Repeat levels are pending.   · S

## 2021-02-08 ENCOUNTER — TELEPHONE (OUTPATIENT)
Dept: HEMATOLOGY/ONCOLOGY | Facility: HOSPITAL | Age: 31
End: 2021-02-08

## 2021-02-08 NOTE — TELEPHONE ENCOUNTER
Patient sent an after hour answering service message for a refill on medication, Pharmacy called her on Friday but it has not been filled, she would like a call back and her phone mailbox is full and no message can be left.

## 2021-02-08 NOTE — TELEPHONE ENCOUNTER
Spoke with patient. Refill sent. Follow-up appointment scheduled with patient. Time/date/location all verified.

## 2021-02-10 ENCOUNTER — TELEPHONE (OUTPATIENT)
Dept: HEMATOLOGY/ONCOLOGY | Facility: HOSPITAL | Age: 31
End: 2021-02-10

## 2021-02-12 ENCOUNTER — APPOINTMENT (OUTPATIENT)
Dept: HEMATOLOGY/ONCOLOGY | Facility: HOSPITAL | Age: 31
End: 2021-02-12

## 2021-04-21 NOTE — TELEPHONE ENCOUNTER
pavel calling for refill request apixaban 5 MG Oral Tab pavel has not found a new hematologist yet.  Thank you William Kuhn

## 2021-04-21 NOTE — TELEPHONE ENCOUNTER
Pt has not yet established with new hematologist yet. Our office does not accept listed insurance. Will sent 1 month supply to pharmacy.

## 2021-06-21 RX ORDER — APIXABAN 5 MG/1
TABLET, FILM COATED ORAL
Qty: 60 TABLET | Refills: 0 | Status: SHIPPED | OUTPATIENT
Start: 2021-06-21 | End: 2021-08-13

## 2021-08-13 RX ORDER — APIXABAN 5 MG/1
TABLET, FILM COATED ORAL
Qty: 60 TABLET | Refills: 0 | Status: SHIPPED | OUTPATIENT
Start: 2021-08-13 | End: 2021-09-16

## 2021-09-16 RX ORDER — APIXABAN 5 MG/1
TABLET, FILM COATED ORAL
Qty: 60 TABLET | Refills: 0 | Status: SHIPPED | OUTPATIENT
Start: 2021-09-16 | End: 2021-10-22

## 2022-02-10 NOTE — PROGRESS NOTES
RHEUMATOLOGY NEW PATIENT   Date of visit: 1/10/2020  ? Patient presents with:  Establish Care: NP ref Dr. Rm Menezes for abnormal labs. Pt states 'got a DVT in August and another in December.' No fam hx autoimmune diseases.       ASSESSMENT, DISCUSSION & PLAN Waist was 36in   Future  -     ANCA PANEL VASCULITIS W/REFLEX; Future  -     URINALYSIS, ROUTINE; Future  -     FERNY BY IFA, IGG; Future  -     FERNY, DIRECT, REFLEX TO 9 ENAS; Future  -     ANTISCLERODERMA-70 ANTIBODIES;  Future  -     LUPUS ANTICOAGULANT COMP; Future  - aspect of the knee. After going on a 3 mile hike, had significantly worsened pain and localized. She noticed color changes of the leg and significantly swelling. She did try to delay evaluation due to insurance issues.  Did get dx with blood clot from super acid reflux. The patient denies any history of uveitis. There are no symptoms of severe dry eyes, dry mouth, or swelling of the cheeks or under the jawbone. No fevers, chills, lymphadenopathy, unexpected weight loss, or unexplained weakness.   Denies chr Negative for frequency and urgency. Musculoskeletal: Positive for joint pain. Negative for back pain and neck pain. Skin: Negative for itching and rash. Neurological: Positive for dizziness. Negative for weakness and headaches.    Endo/Heme/Allergies: without medial joint line tenderness, mild right knee crepitus, no effusion. Lymphadenopathy:     She has no cervical adenopathy. Neurological: She is alert and oriented to person, place, and time. No cranial nerve deficit.    Skin: Skin is warm and d of thrombus present within the left distal femoral vein and left popliteal vein which appear partially occlusive. No priors studies are available for comparison, however this may be chronic.   The patient has pain in the region of the   calf where occluded the colon. Descending colon is decompressed. No dilated small bowel loops. ABDOMINAL WALL:  Tiny fat containing umbilical hernia. Aristeo Monsivais PELVIC ORGANS:  2.1 cm cyst in the right ovary. BONES:  Mild degenerative changes in the lower lumbar facets.       = Rheumatology  1/10/2020

## (undated) NOTE — Clinical Note
Hi,I plan on getting a Venogram on her to assess for a possible illiac thrombosis. Also, we discussed Depo vs. IUD. She is going to weigh her options. I would prefer an IUD, but Depo is an acceptable alternative.  Thanks again

## (undated) NOTE — Clinical Note
Preet Lockwood Angry! Thank you for referring Ms. Lillie Byers for rheumatologic evaluation. Please see the discussion portion of my note and let me know if you have any questions. I will keep you updated on what her work up reveals.  Frances Leary, DOEM[facility] Rheum

## (undated) NOTE — MR AVS SNAPSHOT
After Visit Summary   11/25/2019    Jayne Goldberg    MRN: [id number]           Visit Information     Date & Time  11/25/2019 10:00 AM Provider  Warden Rogerio MD [place] [address] in [address]  Phone  [phone]      You D-DIMER [[id number] CUSTOM]  monthly until 11/25/2020 11/25/2020      Future Appointments        Provider Department    11/27/2019 2:30 PM [facility] [address] in [place]    12/23/2019 8:30 AM [facility] [address] in [place]    1/10/2020 VIDEO VISITS  Average cost  $35*    e-VISITS  Average cost  $35*      [address]  Monday - Friday  10:00 am - 10:00 pm  Saturday - Sunday  10:00 am - 4:00 pm      [place]  Monday - Fr

## (undated) NOTE — LETTER
[facility] [address], [address]  Consent for Procedure/Sedation    Date:     Time:       1.  I authorize the performance upon Lillie Byers the following:  LEFT LEG VENOUS ANGIOGRAM     2. I authorize Dr. Susan Medina (and whomever is ________________________________    ___________________    Witness: _________________________      Date: ___________________    Printed: 2019   12:43 PM  Patient Name: Hollie Ramirez        : 1990       Medical Record #: [id number]

## (undated) NOTE — LETTER
To Whom It May Concern: This certifies that Garry Larose was seen in my office today. Please excuse Garry Larose from work today. Do not hesitate to call with any questions or concerns.                                     Sincerely,    Art Madrid